# Patient Record
Sex: FEMALE | Race: WHITE | NOT HISPANIC OR LATINO | URBAN - METROPOLITAN AREA
[De-identification: names, ages, dates, MRNs, and addresses within clinical notes are randomized per-mention and may not be internally consistent; named-entity substitution may affect disease eponyms.]

---

## 2019-03-15 ENCOUNTER — IMPORTED ENCOUNTER (OUTPATIENT)
Dept: URBAN - METROPOLITAN AREA CLINIC 50 | Facility: CLINIC | Age: 64
End: 2019-03-15

## 2020-12-04 NOTE — PATIENT DISCUSSION
360 to all ora 90D and 20 D, no pathology.  Reviewed R-D symptoms rto stat and discussed 24/7 on call.

## 2021-04-17 ASSESSMENT — VISUAL ACUITY
OD_CC: 20/20
OS_BAT: 20/40
OS_CC: 20/30-2
OS_CC: J1+@ 16 IN
OD_OTHER: 20/30. 20/40.
OS_OTHER: 20/40. 20/50.
OD_CC: J1+@ 16 IN
OD_BAT: 20/30

## 2021-04-17 ASSESSMENT — TONOMETRY
OD_IOP_MMHG: 18
OS_IOP_MMHG: 17

## 2021-06-29 ENCOUNTER — PREPPED CHART (OUTPATIENT)
Dept: URBAN - METROPOLITAN AREA CLINIC 52 | Facility: CLINIC | Age: 66
End: 2021-06-29

## 2021-07-02 ENCOUNTER — COMPREHENSIVE EXAM (OUTPATIENT)
Dept: URBAN - METROPOLITAN AREA CLINIC 52 | Facility: CLINIC | Age: 66
End: 2021-07-02

## 2021-07-02 DIAGNOSIS — H35.372: ICD-10-CM

## 2021-07-02 DIAGNOSIS — H25.13: ICD-10-CM

## 2021-07-02 DIAGNOSIS — H52.4: ICD-10-CM

## 2021-07-02 DIAGNOSIS — H35.362: ICD-10-CM

## 2021-07-02 PROCEDURE — 92015 DETERMINE REFRACTIVE STATE: CPT

## 2021-07-02 PROCEDURE — 92014 COMPRE OPH EXAM EST PT 1/>: CPT

## 2021-07-02 PROCEDURE — 92134 CPTRZ OPH DX IMG PST SGM RTA: CPT

## 2021-07-02 ASSESSMENT — VISUAL ACUITY
OS_GLARE: 20/50-2
OS_PH: 20/40-2
OD_GLARE: 20/50-2
OS_CC: 20/50-2
OS_GLARE: 20/60-1
OU_SC: J1+ @ 10"
OD_PH: 20/20-2
OD_GLARE: 20/40-2
OD_CC: 20/30+2

## 2021-07-02 ASSESSMENT — TONOMETRY
OS_IOP_MMHG: 18
OD_IOP_MMHG: 19

## 2021-08-06 ENCOUNTER — BIOMETRY (OUTPATIENT)
Dept: URBAN - METROPOLITAN AREA CLINIC 53 | Facility: CLINIC | Age: 66
End: 2021-08-06

## 2021-08-06 DIAGNOSIS — H25.13: ICD-10-CM

## 2021-08-06 PROCEDURE — TOPOIOL CORNEAL TOPOGRAPHY-PREMIUM IOL

## 2021-08-06 PROCEDURE — 92136 OPHTHALMIC BIOMETRY: CPT

## 2021-08-06 ASSESSMENT — KERATOMETRY
OD_AXISANGLE_DEGREES: 161
OD_AXISANGLE2_DEGREES: 71
OS_AXISANGLE_DEGREES: 006
OS_K1POWER_DIOPTERS: 48.37
OS_AXISANGLE2_DEGREES: 96
OD_K2POWER_DIOPTERS: 49.0
OD_K1POWER_DIOPTERS: 48.12
OS_K2POWER_DIOPTERS: 49.37

## 2021-08-18 ENCOUNTER — PRE OP - CE/IOL OD (OUTPATIENT)
Dept: URBAN - METROPOLITAN AREA CLINIC 53 | Facility: CLINIC | Age: 66
End: 2021-08-18

## 2021-08-18 DIAGNOSIS — H43.813: ICD-10-CM

## 2021-08-18 DIAGNOSIS — H25.812: ICD-10-CM

## 2021-08-18 DIAGNOSIS — H35.373: ICD-10-CM

## 2021-08-18 DIAGNOSIS — H43.811: ICD-10-CM

## 2021-08-18 DIAGNOSIS — H35.362: ICD-10-CM

## 2021-08-18 DIAGNOSIS — H25.811: ICD-10-CM

## 2021-08-18 PROCEDURE — PREOP PRE OP VISIT

## 2021-08-18 PROCEDURE — 92134 CPTRZ OPH DX IMG PST SGM RTA: CPT

## 2021-08-18 ASSESSMENT — KERATOMETRY
OD_K1POWER_DIOPTERS: 48.12
OD_AXISANGLE2_DEGREES: 71
OS_AXISANGLE2_DEGREES: 96
OS_K2POWER_DIOPTERS: 49.37
OS_K1POWER_DIOPTERS: 48.37
OS_AXISANGLE_DEGREES: 006
OD_K2POWER_DIOPTERS: 49.0
OD_AXISANGLE_DEGREES: 161

## 2021-08-18 ASSESSMENT — TONOMETRY
OS_IOP_MMHG: 18
OD_IOP_MMHG: 19

## 2021-08-18 ASSESSMENT — VISUAL ACUITY
OS_CC: 20/40-2
OD_CC: 20/25

## 2021-08-18 NOTE — PATIENT DISCUSSION
CATARACT SURGERY PLANNER - MULTIFOCAL IOL/+FEMTO: Phacoemulsification with IOL: Eye: OD|DOS: 8/26/2021|Model: [email or web address]|Power: 13. 0(ORA)|Target: PLANO|Femto: YES|Arcs: 30° @ 85° ; 30° @ 265|Visc: DUET|Omidria: YES|10% Phenylephrine: YES|Epi-shugarcaine: YES|Phaco Setting: STD|BSS+: NO|Trypan Blue: NO|CTR: NO|Olive Tip: NO|Atropine: NO|Pupilloplasty: NO|Notes: Plan: PanOptix OD; PanOptix Toric OS; Target [place] OU. Hx: mild Amblyopia OS; very faint ERM OU; Microdrusen OU; Rare Guttata OU. DILATES to 7mm. Willie Greening

## 2021-08-26 ENCOUNTER — SAME DAY PO - CE/IOL (OUTPATIENT)
Dept: URBAN - METROPOLITAN AREA CLINIC 48 | Facility: CLINIC | Age: 66
End: 2021-08-26

## 2021-08-26 ENCOUNTER — SURGERY/PROCEDURE (OUTPATIENT)
Dept: URBAN - METROPOLITAN AREA SURGERY 16 | Facility: SURGERY | Age: 66
End: 2021-08-26

## 2021-08-26 DIAGNOSIS — H25.11: ICD-10-CM

## 2021-08-26 DIAGNOSIS — H35.372: ICD-10-CM

## 2021-08-26 DIAGNOSIS — H35.362: ICD-10-CM

## 2021-08-26 DIAGNOSIS — Z96.1: ICD-10-CM

## 2021-08-26 DIAGNOSIS — H25.811: ICD-10-CM

## 2021-08-26 DIAGNOSIS — H52.4: ICD-10-CM

## 2021-08-26 DIAGNOSIS — Z98.41: ICD-10-CM

## 2021-08-26 PROCEDURE — 66984 XCAPSL CTRC RMVL W/O ECP: CPT

## 2021-08-26 PROCEDURE — 99024 POSTOP FOLLOW-UP VISIT: CPT

## 2021-08-26 PROCEDURE — TFAT00FEMT PANOPTIX TRIFOCAL IOL WITH FEMTO

## 2021-08-26 ASSESSMENT — KERATOMETRY
OS_AXISANGLE2_DEGREES: 96
OD_K2POWER_DIOPTERS: 49.0
OS_K2POWER_DIOPTERS: 49.37
OS_K1POWER_DIOPTERS: 48.37
OD_AXISANGLE_DEGREES: 161
OS_AXISANGLE_DEGREES: 006
OD_K2POWER_DIOPTERS: 49.0
OD_K1POWER_DIOPTERS: 48.12
OS_AXISANGLE_DEGREES: 006
OS_AXISANGLE2_DEGREES: 96
OD_AXISANGLE_DEGREES: 161
OD_AXISANGLE2_DEGREES: 71
OD_K1POWER_DIOPTERS: 48.12
OS_K2POWER_DIOPTERS: 49.37
OS_K1POWER_DIOPTERS: 48.37
OD_AXISANGLE2_DEGREES: 71

## 2021-08-26 ASSESSMENT — TONOMETRY: OD_IOP_MMHG: 18

## 2021-08-26 ASSESSMENT — VISUAL ACUITY: OD_SC: 20/80

## 2021-08-26 NOTE — PATIENT DISCUSSION
CATARACT SURGERY PLANNER - MULTIFOCAL IOL/+FEMTO: Phacoemulsification with IOL: Eye: OD|DOS: 8/26/2021|Model: [email or web address]|Power: 13. 0(ORA)|Target: PLANO|Femto: YES|Arcs: 30° @ 85° ; 30° @ 265|Visc: DUET|Omidria: YES|10% Phenylephrine: YES|Epi-shugarcaine: YES|Phaco Setting: STD|BSS+: NO|Trypan Blue: NO|CTR: NO|Olive Tip: NO|Atropine: NO|Pupilloplasty: NO|Notes: Plan: PanOptix OD; PanOptix Toric OS; Target [place] OU. Hx: mild Amblyopia OS; very faint ERM OU; Microdrusen OU; Rare Guttata OU. DILATES to 7mm. Vini Espinosa

## 2021-08-26 NOTE — PATIENT DISCUSSION
CATARACT SURGERY PLANNER - MULTIFOCAL IOL/+FEMTO: Phacoemulsification with IOL: Eye: OD|DOS: 8/26/2021|Model: [email or web address]|Power: 13. 0(ORA)|Target: PLANO|Femto: YES|Arcs: 30° @ 85° ; 30° @ 265|Visc: DUET|Omidria: YES|10% Phenylephrine: YES|Epi-shugarcaine: YES|Phaco Setting: STD|BSS+: NO|Trypan Blue: NO|CTR: NO|Olive Tip: NO|Atropine: NO|Pupilloplasty: NO|Notes: Plan: PanOptix OD; PanOptix Toric OS; Target [place] OU. Hx: mild Amblyopia OS; very faint ERM OU; Microdrusen OU; Rare Guttata OU. DILATES to 7mm. Darin Chang

## 2021-09-01 ENCOUNTER — PRE OP - CE/IOL OS / 1 WEEK PO OD (OUTPATIENT)
Dept: URBAN - METROPOLITAN AREA CLINIC 53 | Facility: CLINIC | Age: 66
End: 2021-09-01

## 2021-09-01 DIAGNOSIS — H35.372: ICD-10-CM

## 2021-09-01 DIAGNOSIS — Z98.41: ICD-10-CM

## 2021-09-01 DIAGNOSIS — H52.4: ICD-10-CM

## 2021-09-01 DIAGNOSIS — H25.812: ICD-10-CM

## 2021-09-01 PROCEDURE — 92136 - 2N OPHTHALMIC BIOMETRY BY PARTIAL COHERENCE INTERFEROMETRY WITH INTRAOCULAR LENS POWER CALCULATION

## 2021-09-01 PROCEDURE — PREOP PRE OP VISIT

## 2021-09-01 ASSESSMENT — KERATOMETRY
OS_K2POWER_DIOPTERS: 49.37
OD_K1POWER_DIOPTERS: 48.12
OD_AXISANGLE2_DEGREES: 71
OS_K1POWER_DIOPTERS: 48.37
OS_AXISANGLE_DEGREES: 006
OD_K2POWER_DIOPTERS: 49.0
OS_AXISANGLE2_DEGREES: 96
OD_AXISANGLE_DEGREES: 161

## 2021-09-01 ASSESSMENT — VISUAL ACUITY
OS_PH: 20/150
OS_SC: 20/300
OD_SC: J2
OD_SC: J1
OD_SC: 20/20-2

## 2021-09-01 ASSESSMENT — TONOMETRY
OS_IOP_MMHG: 17
OD_IOP_MMHG: 17

## 2021-09-01 NOTE — PATIENT DISCUSSION
Appropriate expectations were communicated with particular emphasis on there being no guarantee to be spectacle free.

## 2021-09-01 NOTE — PATIENT DISCUSSION
CATARACT SURGERY PLANNER - MULTIFOCAL IOL/+FEMTO: Phacoemulsification with IOL: Eye: OS|DOS: 9/16/2021|Model: [email or web address]|Power: 14. 0|Target: PLANO|Femto: YES|Arcs: NA|Axis: 95°|Visc: DUET|Omidria: YES|10% Phenylephrine: YES|Epi-shugarcaine: YES|Phaco Setting: STD|BSS+: Liddie Cancer: NO|CTR: NO|Olive Tip: NO|Atropine: NO|Pupilloplasty: NO|Notes: Plan: PanOptix Toric OS; PanOptix w/Arcs OD; Target [place] OU. Hx: mild Amblyopia OS; very faint ERM OU; rare Guttata OU.

## 2021-09-16 ENCOUNTER — SURGERY/PROCEDURE (OUTPATIENT)
Dept: URBAN - METROPOLITAN AREA SURGERY 16 | Facility: SURGERY | Age: 66
End: 2021-09-16

## 2021-09-16 ENCOUNTER — SAME DAY PO - CE/IOL (OUTPATIENT)
Dept: URBAN - METROPOLITAN AREA CLINIC 48 | Facility: CLINIC | Age: 66
End: 2021-09-16

## 2021-09-16 DIAGNOSIS — H35.372: ICD-10-CM

## 2021-09-16 DIAGNOSIS — H25.812: ICD-10-CM

## 2021-09-16 DIAGNOSIS — Z98.41: ICD-10-CM

## 2021-09-16 DIAGNOSIS — Z98.42: ICD-10-CM

## 2021-09-16 DIAGNOSIS — H52.4: ICD-10-CM

## 2021-09-16 PROCEDURE — 99024 POSTOP FOLLOW-UP VISIT: CPT

## 2021-09-16 PROCEDURE — 66984 XCAPSL CTRC RMVL W/O ECP: CPT

## 2021-09-16 PROCEDURE — TFATXXFEMT

## 2021-09-16 ASSESSMENT — KERATOMETRY
OS_AXISANGLE_DEGREES: 006
OD_K1POWER_DIOPTERS: 48.12
OS_K1POWER_DIOPTERS: 48.37
OS_K2POWER_DIOPTERS: 49.37
OS_AXISANGLE2_DEGREES: 96
OD_K2POWER_DIOPTERS: 49.0
OD_AXISANGLE2_DEGREES: 71
OD_K2POWER_DIOPTERS: 49.0
OD_AXISANGLE_DEGREES: 161
OD_K1POWER_DIOPTERS: 48.12
OS_K2POWER_DIOPTERS: 49.37
OD_AXISANGLE2_DEGREES: 71
OS_AXISANGLE2_DEGREES: 96
OS_K1POWER_DIOPTERS: 48.37
OS_AXISANGLE_DEGREES: 006
OD_AXISANGLE_DEGREES: 161

## 2021-09-16 ASSESSMENT — VISUAL ACUITY: OS_SC: 20/60

## 2021-09-16 ASSESSMENT — TONOMETRY: OS_IOP_MMHG: 20

## 2021-09-16 NOTE — PATIENT DISCUSSION
CATARACT SURGERY PLANNER - MULTIFOCAL IOL/+FEMTO: Phacoemulsification with IOL: Eye: OS|DOS: 9/16/2021|Model: [email or web address]|Power: 14. 0|Target: PLANO|Femto: YES|Arcs: NA|Axis: 95°|Visc: DUET|Omidria: YES|10% Phenylephrine: YES|Epi-shugarcaine: YES|Phaco Setting: STD|BSS+: Bettylou Sikh: NO|CTR: NO|Olive Tip: NO|Atropine: NO|Pupilloplasty: NO|Notes: Plan: PanOptix Toric OS; PanOptix w/Arcs OD; Target [place] OU. Hx: mild Amblyopia OS; very faint ERM OU; rare Guttata OU.

## 2021-09-22 ENCOUNTER — 1 WEEK PO - CE/IOL (OUTPATIENT)
Dept: URBAN - METROPOLITAN AREA CLINIC 53 | Facility: CLINIC | Age: 66
End: 2021-09-22

## 2021-09-22 DIAGNOSIS — Z98.42: ICD-10-CM

## 2021-09-22 PROCEDURE — 99024 POSTOP FOLLOW-UP VISIT: CPT

## 2021-09-22 ASSESSMENT — KERATOMETRY
OS_K2POWER_DIOPTERS: 49.37
OD_AXISANGLE2_DEGREES: 71
OS_AXISANGLE_DEGREES: 006
OD_K2POWER_DIOPTERS: 49.0
OS_AXISANGLE2_DEGREES: 96
OS_K1POWER_DIOPTERS: 48.37
OD_K1POWER_DIOPTERS: 48.12
OD_AXISANGLE_DEGREES: 161

## 2021-09-22 ASSESSMENT — VISUAL ACUITY
OD_SC: 20/25
OS_PH: 20/25
OS_SC: 20/30

## 2021-09-22 ASSESSMENT — TONOMETRY
OS_IOP_MMHG: 16
OD_IOP_MMHG: 16

## 2021-10-20 ENCOUNTER — 4 WEEK PO - CE/IOL (OUTPATIENT)
Dept: URBAN - METROPOLITAN AREA CLINIC 53 | Facility: CLINIC | Age: 66
End: 2021-10-20

## 2021-10-20 DIAGNOSIS — Z98.42: ICD-10-CM

## 2021-10-20 DIAGNOSIS — H52.4: ICD-10-CM

## 2021-10-20 DIAGNOSIS — Z98.41: ICD-10-CM

## 2021-10-20 DIAGNOSIS — H35.373: ICD-10-CM

## 2021-10-20 DIAGNOSIS — Z96.1: ICD-10-CM

## 2021-10-20 PROCEDURE — 99024 POSTOP FOLLOW-UP VISIT: CPT

## 2021-10-20 PROCEDURE — 92015NC REFRACTION NO CHARGE

## 2021-10-20 PROCEDURE — 92134 CPTRZ OPH DX IMG PST SGM RTA: CPT

## 2021-10-20 ASSESSMENT — KERATOMETRY
OD_K1POWER_DIOPTERS: 48.12
OS_K1POWER_DIOPTERS: 48.37
OD_AXISANGLE_DEGREES: 161
OS_AXISANGLE_DEGREES: 006
OD_K2POWER_DIOPTERS: 49.0
OS_K2POWER_DIOPTERS: 49.37
OS_AXISANGLE2_DEGREES: 96
OD_AXISANGLE2_DEGREES: 71

## 2021-10-20 ASSESSMENT — VISUAL ACUITY
OS_SC: 20/30-2
OD_GLARE: 20/30
OS_SC: J3
OD_GLARE: 20/25
OS_GLARE: 20/40
OD_SC: 20/20-1
OU_SC: J1
OD_SC: J1
OS_GLARE: 20/40
OU_SC: 20/20

## 2021-10-20 ASSESSMENT — TONOMETRY
OD_IOP_MMHG: 16
OS_IOP_MMHG: 16

## 2022-01-20 ENCOUNTER — FOLLOW UP (OUTPATIENT)
Dept: URBAN - METROPOLITAN AREA CLINIC 53 | Facility: CLINIC | Age: 67
End: 2022-01-20

## 2022-01-20 DIAGNOSIS — H35.372: ICD-10-CM

## 2022-01-20 DIAGNOSIS — H26.493: ICD-10-CM

## 2022-01-20 PROCEDURE — 92014 COMPRE OPH EXAM EST PT 1/>: CPT

## 2022-01-20 PROCEDURE — 92134 CPTRZ OPH DX IMG PST SGM RTA: CPT

## 2022-01-20 ASSESSMENT — VISUAL ACUITY
OS_GLARE: 20/60
OS_SC: J5-1
OS_GLARE: 20/50+2
OD_GLARE: 20/30
OD_GLARE: 20/25
OD_SC: 20/25+2
OS_SC: 20/40+2
OD_SC: J1+-1
OU_SC: 20/25
OU_SC: J1+-2

## 2022-01-20 ASSESSMENT — KERATOMETRY
OD_AXISANGLE2_DEGREES: 070
OS_K2POWER_DIOPTERS: 49.25
OD_K1POWER_DIOPTERS: 47.75
OD_K2POWER_DIOPTERS: 48.50
OS_AXISANGLE_DEGREES: 13
OS_K1POWER_DIOPTERS: 48.00
OS_AXISANGLE2_DEGREES: 103
OD_AXISANGLE_DEGREES: 160

## 2022-01-20 ASSESSMENT — TONOMETRY
OD_IOP_MMHG: 15
OS_IOP_MMHG: 15

## 2024-02-09 ENCOUNTER — COMPREHENSIVE EXAM (OUTPATIENT)
Dept: URBAN - METROPOLITAN AREA CLINIC 52 | Facility: CLINIC | Age: 69
End: 2024-02-09

## 2024-02-09 DIAGNOSIS — H02.834: ICD-10-CM

## 2024-02-09 DIAGNOSIS — H35.372: ICD-10-CM

## 2024-02-09 DIAGNOSIS — H43.813: ICD-10-CM

## 2024-02-09 DIAGNOSIS — H26.493: ICD-10-CM

## 2024-02-09 DIAGNOSIS — H02.831: ICD-10-CM

## 2024-02-09 PROCEDURE — 99214 OFFICE O/P EST MOD 30 MIN: CPT

## 2024-02-09 PROCEDURE — 92285 EXTERNAL OCULAR PHOTOGRAPHY: CPT

## 2024-02-09 PROCEDURE — 92134 CPTRZ OPH DX IMG PST SGM RTA: CPT

## 2024-02-09 ASSESSMENT — KERATOMETRY
OS_AXISANGLE2_DEGREES: 103
OS_K1POWER_DIOPTERS: 48.00
OD_K2POWER_DIOPTERS: 48.50
OS_K2POWER_DIOPTERS: 49.25
OD_K1POWER_DIOPTERS: 47.75
OD_AXISANGLE2_DEGREES: 070
OD_AXISANGLE_DEGREES: 160
OS_AXISANGLE_DEGREES: 13

## 2024-02-09 ASSESSMENT — TONOMETRY
OS_IOP_MMHG: 17
OD_IOP_MMHG: 17

## 2024-02-09 ASSESSMENT — VISUAL ACUITY
OS_PH: 20/30-1
OS_GLARE: 20/60
OS_SC: 20/40-1
OD_GLARE: 20/25
OD_SC: 20/25
OS_GLARE: 20/70
OD_GLARE: 20/20

## 2025-08-07 ENCOUNTER — COMPREHENSIVE EXAM (OUTPATIENT)
Age: 70
End: 2025-08-07

## 2025-08-07 DIAGNOSIS — H53.032: ICD-10-CM

## 2025-08-07 DIAGNOSIS — H35.372: ICD-10-CM

## 2025-08-07 DIAGNOSIS — H26.493: ICD-10-CM

## 2025-08-07 DIAGNOSIS — H43.813: ICD-10-CM

## 2025-08-07 DIAGNOSIS — H02.834: ICD-10-CM

## 2025-08-07 DIAGNOSIS — H02.831: ICD-10-CM

## 2025-08-07 PROCEDURE — 99214 OFFICE O/P EST MOD 30 MIN: CPT

## 2025-08-07 PROCEDURE — 92134 CPTRZ OPH DX IMG PST SGM RTA: CPT
